# Patient Record
Sex: FEMALE | URBAN - METROPOLITAN AREA
[De-identification: names, ages, dates, MRNs, and addresses within clinical notes are randomized per-mention and may not be internally consistent; named-entity substitution may affect disease eponyms.]

---

## 2018-06-19 ENCOUNTER — OFFICE VISIT (OUTPATIENT)
Dept: URBAN - METROPOLITAN AREA CLINIC 58 | Facility: CLINIC | Age: 53
End: 2018-06-19
Payer: COMMERCIAL

## 2018-06-19 DIAGNOSIS — H52.13 MYOPIA, BILATERAL: Primary | ICD-10-CM

## 2018-06-19 PROCEDURE — 92015 DETERMINE REFRACTIVE STATE: CPT | Performed by: OPTOMETRIST

## 2018-06-19 PROCEDURE — 92014 COMPRE OPH EXAM EST PT 1/>: CPT | Performed by: OPTOMETRIST

## 2018-06-19 ASSESSMENT — INTRAOCULAR PRESSURE
OD: 19
OS: 19

## 2018-06-19 ASSESSMENT — VISUAL ACUITY
OD: 20/20
OS: 20/20

## 2018-06-19 NOTE — IMPRESSION/PLAN
Impression: Myopia, bilateral: H52.13. Plan: Diagnosis discussed. SRx released, pt edu that a temporary adjustment is expected. Continue to monitor.